# Patient Record
Sex: FEMALE | URBAN - METROPOLITAN AREA
[De-identification: names, ages, dates, MRNs, and addresses within clinical notes are randomized per-mention and may not be internally consistent; named-entity substitution may affect disease eponyms.]

---

## 2022-07-26 ENCOUNTER — OFFICE VISIT (OUTPATIENT)
Dept: URBAN - METROPOLITAN AREA CLINIC 43 | Facility: CLINIC | Age: 22
End: 2022-07-26
Payer: COMMERCIAL

## 2022-07-26 DIAGNOSIS — H04.123 DRY EYE SYNDROME OF BILATERAL LACRIMAL GLANDS: ICD-10-CM

## 2022-07-26 DIAGNOSIS — Z79.899 OTHER LONG TERM DRUG THERAPY: Primary | ICD-10-CM

## 2022-07-26 PROCEDURE — 92134 CPTRZ OPH DX IMG PST SGM RTA: CPT | Performed by: OPTOMETRIST

## 2022-07-26 PROCEDURE — 99204 OFFICE O/P NEW MOD 45 MIN: CPT | Performed by: OPTOMETRIST

## 2022-07-26 PROCEDURE — 92083 EXTENDED VISUAL FIELD XM: CPT | Performed by: OPTOMETRIST

## 2022-07-26 ASSESSMENT — KERATOMETRY
OD: 45.75
OS: 45.75

## 2022-07-26 ASSESSMENT — INTRAOCULAR PRESSURE
OD: 18
OS: 17

## 2022-07-26 NOTE — IMPRESSION/PLAN
Impression: Other long term drug therapy: Z79.899. Plan: pt taking plaquenil 200mg po QD, started two weeks ago, here for baseline testing HVF10-2 today: reliable, OD: scattered defects, mostly normal, OS: few defects, mostly normal
mac oCT today:  normal outer retina structures, no signs of maculopathy, normal
cont care with rheumatology, examine annually with CE/HVF10-2/mac OCT